# Patient Record
Sex: MALE | Race: WHITE | NOT HISPANIC OR LATINO | Employment: FULL TIME
[De-identification: names, ages, dates, MRNs, and addresses within clinical notes are randomized per-mention and may not be internally consistent; named-entity substitution may affect disease eponyms.]

---

## 2017-02-25 ENCOUNTER — RECORDS - HEALTHEAST (OUTPATIENT)
Dept: ADMINISTRATIVE | Facility: OTHER | Age: 37
End: 2017-02-25

## 2017-07-11 ENCOUNTER — OFFICE VISIT - HEALTHEAST (OUTPATIENT)
Dept: INTERNAL MEDICINE | Facility: CLINIC | Age: 37
End: 2017-07-11

## 2017-07-11 DIAGNOSIS — Z00.00 HEALTH CARE MAINTENANCE: ICD-10-CM

## 2017-07-11 DIAGNOSIS — L65.9 HAIR LOSS: ICD-10-CM

## 2017-07-11 LAB
CHOLEST SERPL-MCNC: 173 MG/DL
FASTING STATUS PATIENT QL REPORTED: YES
HDLC SERPL-MCNC: 71 MG/DL
LDLC SERPL CALC-MCNC: 95 MG/DL
TRIGL SERPL-MCNC: 37 MG/DL

## 2017-07-11 ASSESSMENT — MIFFLIN-ST. JEOR: SCORE: 1846.57

## 2017-07-12 ENCOUNTER — COMMUNICATION - HEALTHEAST (OUTPATIENT)
Dept: INTERNAL MEDICINE | Facility: CLINIC | Age: 37
End: 2017-07-12

## 2017-09-21 ENCOUNTER — COMMUNICATION - HEALTHEAST (OUTPATIENT)
Dept: INTERNAL MEDICINE | Facility: CLINIC | Age: 37
End: 2017-09-21

## 2017-09-21 DIAGNOSIS — L65.9 HAIR LOSS: ICD-10-CM

## 2017-12-18 ENCOUNTER — COMMUNICATION - HEALTHEAST (OUTPATIENT)
Dept: INTERNAL MEDICINE | Facility: CLINIC | Age: 37
End: 2017-12-18

## 2017-12-18 DIAGNOSIS — L65.9 HAIR LOSS: ICD-10-CM

## 2018-01-08 ENCOUNTER — COMMUNICATION - HEALTHEAST (OUTPATIENT)
Dept: INTERNAL MEDICINE | Facility: CLINIC | Age: 38
End: 2018-01-08

## 2018-01-08 DIAGNOSIS — M25.511 RIGHT SHOULDER PAIN: ICD-10-CM

## 2018-05-24 ENCOUNTER — RECORDS - HEALTHEAST (OUTPATIENT)
Dept: ADMINISTRATIVE | Facility: OTHER | Age: 38
End: 2018-05-24

## 2018-06-06 ENCOUNTER — OFFICE VISIT - HEALTHEAST (OUTPATIENT)
Dept: INTERNAL MEDICINE | Facility: CLINIC | Age: 38
End: 2018-06-06

## 2018-06-06 DIAGNOSIS — J32.9 SINUSITIS: ICD-10-CM

## 2018-06-06 ASSESSMENT — MIFFLIN-ST. JEOR: SCORE: 1849.74

## 2018-12-27 ENCOUNTER — OFFICE VISIT - HEALTHEAST (OUTPATIENT)
Dept: INTERNAL MEDICINE | Facility: CLINIC | Age: 38
End: 2018-12-27

## 2018-12-27 DIAGNOSIS — J06.9 VIRAL UPPER RESPIRATORY TRACT INFECTION: ICD-10-CM

## 2018-12-27 ASSESSMENT — MIFFLIN-ST. JEOR: SCORE: 1910.07

## 2019-01-19 ENCOUNTER — RECORDS - HEALTHEAST (OUTPATIENT)
Dept: ADMINISTRATIVE | Facility: OTHER | Age: 39
End: 2019-01-19

## 2019-03-19 ENCOUNTER — COMMUNICATION - HEALTHEAST (OUTPATIENT)
Dept: INTERNAL MEDICINE | Facility: CLINIC | Age: 39
End: 2019-03-19

## 2019-03-19 DIAGNOSIS — L65.9 HAIR LOSS: ICD-10-CM

## 2019-05-03 ENCOUNTER — COMMUNICATION - HEALTHEAST (OUTPATIENT)
Dept: INTERNAL MEDICINE | Facility: CLINIC | Age: 39
End: 2019-05-03

## 2019-06-26 ENCOUNTER — COMMUNICATION - HEALTHEAST (OUTPATIENT)
Dept: INTERNAL MEDICINE | Facility: CLINIC | Age: 39
End: 2019-06-26

## 2019-06-26 ENCOUNTER — RECORDS - HEALTHEAST (OUTPATIENT)
Dept: ADMINISTRATIVE | Facility: OTHER | Age: 39
End: 2019-06-26

## 2019-06-26 DIAGNOSIS — L65.9 HAIR LOSS: ICD-10-CM

## 2019-08-13 ENCOUNTER — RECORDS - HEALTHEAST (OUTPATIENT)
Dept: ADMINISTRATIVE | Facility: OTHER | Age: 39
End: 2019-08-13

## 2019-11-21 ENCOUNTER — OFFICE VISIT - HEALTHEAST (OUTPATIENT)
Dept: INTERNAL MEDICINE | Facility: CLINIC | Age: 39
End: 2019-11-21

## 2019-11-21 DIAGNOSIS — Z13.220 SCREENING FOR HYPERLIPIDEMIA: ICD-10-CM

## 2019-11-21 DIAGNOSIS — Z00.00 ENCOUNTER FOR GENERAL HEALTH EXAMINATION: ICD-10-CM

## 2019-11-21 DIAGNOSIS — M85.80 OSTEOPENIA, UNSPECIFIED LOCATION: ICD-10-CM

## 2019-11-21 LAB
ALBUMIN SERPL-MCNC: 4.5 G/DL (ref 3.5–5)
ALP SERPL-CCNC: 66 U/L (ref 45–120)
ALT SERPL W P-5'-P-CCNC: 23 U/L (ref 0–45)
ANION GAP SERPL CALCULATED.3IONS-SCNC: 9 MMOL/L (ref 5–18)
AST SERPL W P-5'-P-CCNC: 27 U/L (ref 0–40)
BASOPHILS # BLD AUTO: 0.1 THOU/UL (ref 0–0.2)
BASOPHILS NFR BLD AUTO: 1 % (ref 0–2)
BILIRUB SERPL-MCNC: 0.9 MG/DL (ref 0–1)
BUN SERPL-MCNC: 17 MG/DL (ref 8–22)
CALCIUM SERPL-MCNC: 10 MG/DL (ref 8.5–10.5)
CHLORIDE BLD-SCNC: 103 MMOL/L (ref 98–107)
CHOLEST SERPL-MCNC: 198 MG/DL
CO2 SERPL-SCNC: 28 MMOL/L (ref 22–31)
CREAT SERPL-MCNC: 1.23 MG/DL (ref 0.7–1.3)
EOSINOPHIL # BLD AUTO: 0.1 THOU/UL (ref 0–0.4)
EOSINOPHIL NFR BLD AUTO: 2 % (ref 0–6)
ERYTHROCYTE [DISTWIDTH] IN BLOOD BY AUTOMATED COUNT: 10.9 % (ref 11–14.5)
FASTING STATUS PATIENT QL REPORTED: YES
GFR SERPL CREATININE-BSD FRML MDRD: >60 ML/MIN/1.73M2
GLUCOSE BLD-MCNC: 97 MG/DL (ref 70–125)
HCT VFR BLD AUTO: 41.2 % (ref 40–54)
HDLC SERPL-MCNC: 79 MG/DL
HGB BLD-MCNC: 13.9 G/DL (ref 14–18)
LDLC SERPL CALC-MCNC: 95 MG/DL
LYMPHOCYTES # BLD AUTO: 1.8 THOU/UL (ref 0.8–4.4)
LYMPHOCYTES NFR BLD AUTO: 19 % (ref 20–40)
MCH RBC QN AUTO: 32.3 PG (ref 27–34)
MCHC RBC AUTO-ENTMCNC: 33.8 G/DL (ref 32–36)
MCV RBC AUTO: 96 FL (ref 80–100)
MONOCYTES # BLD AUTO: 0.7 THOU/UL (ref 0–0.9)
MONOCYTES NFR BLD AUTO: 7 % (ref 2–10)
NEUTROPHILS # BLD AUTO: 6.5 THOU/UL (ref 2–7.7)
NEUTROPHILS NFR BLD AUTO: 71 % (ref 50–70)
PLATELET # BLD AUTO: 203 THOU/UL (ref 140–440)
PMV BLD AUTO: 7.2 FL (ref 7–10)
POTASSIUM BLD-SCNC: 3.9 MMOL/L (ref 3.5–5)
PROT SERPL-MCNC: 6.9 G/DL (ref 6–8)
RBC # BLD AUTO: 4.31 MILL/UL (ref 4.4–6.2)
SODIUM SERPL-SCNC: 140 MMOL/L (ref 136–145)
TRIGL SERPL-MCNC: 118 MG/DL
WBC: 9.1 THOU/UL (ref 4–11)

## 2019-11-21 ASSESSMENT — MIFFLIN-ST. JEOR: SCORE: 1924.97

## 2019-11-22 LAB — 25(OH)D3 SERPL-MCNC: 41.2 NG/ML (ref 30–80)

## 2019-11-24 ENCOUNTER — COMMUNICATION - HEALTHEAST (OUTPATIENT)
Dept: INTERNAL MEDICINE | Facility: CLINIC | Age: 39
End: 2019-11-24

## 2019-11-25 ENCOUNTER — RECORDS - HEALTHEAST (OUTPATIENT)
Dept: ADMINISTRATIVE | Facility: OTHER | Age: 39
End: 2019-11-25

## 2019-12-15 ENCOUNTER — RECORDS - HEALTHEAST (OUTPATIENT)
Dept: ADMINISTRATIVE | Facility: OTHER | Age: 39
End: 2019-12-15

## 2020-08-17 ENCOUNTER — COMMUNICATION - HEALTHEAST (OUTPATIENT)
Dept: INTERNAL MEDICINE | Facility: CLINIC | Age: 40
End: 2020-08-17

## 2020-08-17 DIAGNOSIS — Z30.2 ENCOUNTER FOR VASECTOMY: ICD-10-CM

## 2020-09-16 ENCOUNTER — RECORDS - HEALTHEAST (OUTPATIENT)
Dept: ADMINISTRATIVE | Facility: OTHER | Age: 40
End: 2020-09-16

## 2020-09-21 ENCOUNTER — COMMUNICATION - HEALTHEAST (OUTPATIENT)
Dept: INTERNAL MEDICINE | Facility: CLINIC | Age: 40
End: 2020-09-21

## 2020-09-21 DIAGNOSIS — L65.9 HAIR LOSS: ICD-10-CM

## 2020-09-22 RX ORDER — FINASTERIDE 1 MG/1
TABLET, FILM COATED ORAL
Qty: 90 TABLET | Refills: 3 | Status: SHIPPED | OUTPATIENT
Start: 2020-09-22 | End: 2021-10-14

## 2020-10-21 ENCOUNTER — RECORDS - HEALTHEAST (OUTPATIENT)
Dept: ADMINISTRATIVE | Facility: OTHER | Age: 40
End: 2020-10-21

## 2021-02-01 ENCOUNTER — RECORDS - HEALTHEAST (OUTPATIENT)
Dept: ADMINISTRATIVE | Facility: OTHER | Age: 41
End: 2021-02-01

## 2021-05-30 NOTE — TELEPHONE ENCOUNTER
RN cannot approve Refill Request    RN can NOT refill this medication med is not covered by policy/route to provider. Last office visit: Visit date not found Last Physical: 7/11/2017 Last MTM visit: Visit date not found Last visit same specialty: 12/27/2018 Chad Ruiz MD.  Next visit within 3 mo: Visit date not found  Next physical within 3 mo: Visit date not found      Joanne Maldonado, [facility] Triage/Med Refill 6/26/2019    Requested Prescriptions   Pending Prescriptions Disp Refills     finasteride (PROPECIA) 1 mg tablet 90 tablet 3     Sig: Take 1 tablet (1 mg total) by mouth daily.       There is no refill protocol information for this order

## 2021-05-30 NOTE — TELEPHONE ENCOUNTER
Refill Request  Did you contact pharmacy: No.  Patient was informed to call the pharmacy.  Medication name: Finasteride 1 mg  Requested Prescriptions      No prescriptions requested or ordered in this encounter     Who prescribed the medication: Dr. Barlow  Pharmacy Name and Location: [facility]   Is patient out of medication: No.  3 days left  Patient notified refills processed in 72 hours:  yes  Okay to leave a detailed message: yes

## 2021-05-31 VITALS — HEIGHT: 75 IN | WEIGHT: 188.7 LBS | BODY MASS INDEX: 23.46 KG/M2

## 2021-06-01 VITALS — BODY MASS INDEX: 23.55 KG/M2 | HEIGHT: 75 IN | WEIGHT: 189.4 LBS

## 2021-06-02 VITALS — WEIGHT: 202.7 LBS | BODY MASS INDEX: 25.2 KG/M2 | HEIGHT: 75 IN

## 2021-06-03 VITALS
BODY MASS INDEX: 25.66 KG/M2 | HEART RATE: 58 BPM | RESPIRATION RATE: 16 BRPM | OXYGEN SATURATION: 97 % | WEIGHT: 206.4 LBS | DIASTOLIC BLOOD PRESSURE: 70 MMHG | SYSTOLIC BLOOD PRESSURE: 100 MMHG | HEIGHT: 75 IN

## 2021-06-03 NOTE — PATIENT INSTRUCTIONS - HE
1. Discussed healthy diet and exercise.     2. Tetanus booster today.     3. Lab testing today.     4. Follow up in one year.

## 2021-06-03 NOTE — PROGRESS NOTES
ASSESSMENT/PLAN:    1. Screening for hyperlipidemia  - Lipid Cascade    2. Encounter for general health examination  His history and exam are unremarkable. See documentation.    - Comprehensive Metabolic Panel  - HM1(CBC and Differential)    3. Screening for vitamin D deficiency  - Vitamin D, Total (25-Hydroxy)    4. Td immunization  Due for booster    5. Male pattern hair loss  We discussed options.  His hair loss is physiologic.  He opts to try propecia, we discussed potential side effects.     Patient Instructions   1. Discussed healthy diet and exercise.     2. Tetanus booster today.     3. Lab testing today.     4. Follow up in one year.      CHIEF COMPLAINT:  Chief Complaint   Patient presents with     Annual Exam     Fasting labs- No concerns     HISTORY OF PRESENT ILLNESS:  Pradip is a 39 y.o. male presenting to the clinic today for general health evaluation.  He feels well over all.  Has been an avid exerciser.  No unusual dyspnea or cough, or changes in bowel or bladder function. Would like to address balding hair loss.     REVIEW OF SYSTEMS:   Constitutional: no fever, chills, or sweats  Respiratory: No wheezes, cough, shortness of breath  Cardiovascular: No chest pain or palpitations  Gastrointestinal: No nausea, vomiting, diarrhea, dyspepsia, or pain  Neurological: No headache, arm or leg numbness or weakness, or gait disturbance  All other systems on reveiw are negative.    PFSH:  Social History     Tobacco Use   Smoking Status Never Smoker   Smokeless Tobacco Current User     Types: Chew     Family History   Problem Relation Age of Onset     Obesity Mother      Hyperlipidemia Father      Social History     Socioeconomic History     Marital status:      Spouse name: Not on file     Number of children: Not on file     Years of education: Not on file     Highest education level: Not on file   Occupational History     Not on file   Social Needs     Financial resource strain: Not on file     Food  "insecurity:     Worry: Not on file     Inability: Not on file     Transportation needs:     Medical: Not on file     Non-medical: Not on file   Tobacco Use     Smoking status: Never Smoker     Smokeless tobacco: Current User     Types: Chew   Substance and Sexual Activity     Alcohol use: Yes     Comment: WEEKLY      Drug use: No     Sexual activity: Yes     Partners: Female   Lifestyle     Physical activity:     Days per week: Not on file     Minutes per session: Not on file     Stress: Not on file   Relationships     Social connections:     Talks on phone: Not on file     Gets together: Not on file     Attends Church service: Not on file     Active member of club or organization: Not on file     Attends meetings of clubs or organizations: Not on file     Relationship status: Not on file     Intimate partner violence:     Fear of current or ex partner: Not on file     Emotionally abused: Not on file     Physically abused: Not on file     Forced sexual activity: Not on file   Other Topics Concern     Not on file   Social History Narrative    He is  with 2 children.  He is a banker.     Past Surgical History:   Procedure Laterality Date     periodontal  2001     No Known Allergies  Past Medical History:   Diagnosis Date     Migraine      VITALS:  Vitals:    11/21/19 0844   BP: 100/70   Patient Site: Left Arm   Patient Position: Sitting   Cuff Size: Adult Large   Pulse: (!) 58   Resp: 16   SpO2: 97%   Weight: 206 lb 6.4 oz (93.6 kg)   Height: 6' 2.88\" (1.902 m)     Wt Readings from Last 3 Encounters:   11/21/19 206 lb 6.4 oz (93.6 kg)   12/27/18 202 lb 11.2 oz (91.9 kg)   06/06/18 189 lb 6.4 oz (85.9 kg)     Body mass index is 25.88 kg/m .  PHYSICAL EXAM:  General Appearance: In no acute distress    /70 (Patient Site: Left Arm, Patient Position: Sitting, Cuff Size: Adult Large)   Pulse (!) 58   Resp 16   Ht 6' 2.88\" (1.902 m)   Wt 206 lb 6.4 oz (93.6 kg)   SpO2 97%   BMI 25.88 kg/m       EYES: " Clear, fundi are unremarkable, discs flat   HEENT: Without congestion  NECK:  without cervical or axillary adenopathy, thyroid normal  RESPIRATORY: Clear   CARDIOVASCULAR: S1, S2  ABDOMEN: soft, flat, and non-tender, without mass, rebound, or guarding  GENITOURINARY: normal testes and phallus  EXTREMITIES: No joint swelling, no ulcer or edema  NEUROLOGIC: Non-focal, no arm or leg  weakness, speech is clear, gait is normal  PSYCHIATRIC: Oriented X 3, without confusion, behavior and affect normal, thinking is clear    Current Outpatient Medications   Medication Sig Dispense Refill     finasteride (PROPECIA) 1 mg tablet Take 1 tablet (1 mg total) by mouth daily. 90 tablet 3

## 2021-06-10 NOTE — TELEPHONE ENCOUNTER
Spoke with speciality scheduling and they assisted in scheduling pt an appt with [facility].  Speciality Scheduling contacted pt with info.

## 2021-06-10 NOTE — TELEPHONE ENCOUNTER
Check with Pradip regarding the reason for the urology referral request please.  We then can authorize it.    Tim Barlow MD

## 2021-06-11 NOTE — TELEPHONE ENCOUNTER
RN cannot approve Refill Request    RN can NOT refill this medication med is not covered by policy/route to provider. Last office visit: Visit date not found Last Physical: 7/11/2017 Last MTM visit: Visit date not found Last visit same specialty: 12/27/2018 Chad Ruiz MD.  Next visit within 3 mo: Visit date not found  Next physical within 3 mo: Visit date not found      Bryanna Jackson, [facility] Triage/Med Refill 9/22/2020    Requested Prescriptions   Pending Prescriptions Disp Refills     finasteride (PROPECIA) 1 mg tablet [Pharmacy Med Name: FINASTERIDE 1 MG TABLET] 90 tablet 3     Sig: TAKE 1 TABLET BY MOUTH EVERY DAY       There is no refill protocol information for this order

## 2021-06-18 NOTE — PROGRESS NOTES
" ASSESSMENT AND PLAN:    1. Sinusitis  Likely left maxillary sinusitis, following URI.  Will treat with azithromycin 250 mg two today, then one dailly for 4 days.  Follow up if fails to improve.     2. Cervical adenopathy  This is reactive, and doubt significant.  He is urged to follow and be seen for re-evaluation if this enlarges or fails to resolve.  He notes that it is already improved on the L.     CHIEF COMPLAINT:  Chief Complaint   Patient presents with     Sinusitis     congestion; sinus pressure x 2 weeks     HISTORY OF PRESENT ILLNESS:  Pradip Underwood is a 37 y.o. male with URI about 2 weeks ago.  Has persistent congestion and facial discomfort since, with nasal congestion.  No fever.  Has noted left neck lymph node as well.  Early on, there was strep throat, strep test at [facility] clinic was negative.  No cough, or fever, or chills.      Active Ambulatory Problems     Diagnosis Date Noted     Migraine with aura 10/04/2016     Resolved Ambulatory Problems     Diagnosis Date Noted     No Resolved Ambulatory Problems     No Additional Past Medical History     Past Surgical History:   Procedure Laterality Date     periodontal  2001     VITALS:  Vitals:    06/06/18 1030   BP: 118/78   Patient Site: Left Arm   Patient Position: Sitting   Cuff Size: Adult Regular   Pulse: 60   Temp: 97.9  F (36.6  C)   SpO2: 99%   Weight: 189 lb 6.4 oz (85.9 kg)   Height: 6' 3\" (1.905 m)     Wt Readings from Last 3 Encounters:   06/06/18 189 lb 6.4 oz (85.9 kg)   07/11/17 188 lb 11.2 oz (85.6 kg)   10/04/16 187 lb 4.8 oz (85 kg)     PHYSICAL EXAM:  Constitutional:  Well appearing in NAD, alert and oriented  Ears:  Clear.  Oropharynx:  mild erythema  Nose: bilateral rhinitis, L > R, left mucopurulence  Neck:  Supple, no significant adenopathy, there is anterior cervical adenopathy, L > R shotty, not hard, moveable, slightly tender on L   Cardiac:  S1 S2 without murmur, rhythm regular  Lungs: Clear to auscultation, without wheezes " or rales    Current Outpatient Prescriptions   Medication Sig Dispense Refill     finasteride (PROPECIA) 1 mg tablet TAKE 1 TABLET BY MOUTH DAILY. 90 tablet 3     azithromycin (ZITHROMAX) 250 MG tablet Take 500 mg (2 x 250 mg tablets) on day 1 followed by 250 mg (1 tablet) on days 2-5. 6 tablet 0     No current facility-administered medications for this visit.      Chad Ruiz MD  Internal Medicine  [facility]

## 2021-06-19 NOTE — LETTER
Letter by Chad Ruiz MD at      Author: Chad Ruiz MD Service: -- Author Type: --    Filed:  Encounter Date: 11/24/2019 Status: Signed         Pradip Underwood  [address] [place] [address]     November 24, 2019     Dear Mr. Underwood,    Below are the results from your recent visit:    Resulted Orders   Lipid Cascade   Result Value Ref Range    Cholesterol 198 <=199 mg/dL    Triglycerides 118 <=149 mg/dL    HDL Cholesterol 79 >=40 mg/dL    LDL Calculated 95 <=129 mg/dL    Patient Fasting > 8hrs? Yes    Comprehensive Metabolic Panel   Result Value Ref Range    Sodium 140 136 - 145 mmol/L    Potassium 3.9 3.5 - 5.0 mmol/L    Chloride 103 98 - 107 mmol/L    CO2 28 22 - 31 mmol/L    Anion Gap, Calculation 9 5 - 18 mmol/L    Glucose 97 70 - 125 mg/dL    BUN 17 8 - 22 mg/dL    Creatinine 1.23 0.70 - 1.30 mg/dL    GFR MDRD Af Amer >60 >60 mL/min/1.73m2    GFR MDRD Non Af Amer >60 >60 mL/min/1.73m2    Bilirubin, Total 0.9 0.0 - 1.0 mg/dL    Calcium 10.0 8.5 - 10.5 mg/dL    Protein, Total 6.9 6.0 - 8.0 g/dL    Albumin 4.5 3.5 - 5.0 g/dL    Alkaline Phosphatase 66 45 - 120 U/L    AST 27 0 - 40 U/L    ALT 23 0 - 45 U/L    Narrative    Fasting Glucose reference range is 70-99 mg/dL per  American Diabetes Association (ADA) guidelines.   Vitamin D, Total (25-Hydroxy)   Result Value Ref Range    Vitamin D, Total (25-Hydroxy) 41.2 30.0 - 80.0 ng/mL    Narrative    Deficiency <10.0 ng/mL  Insufficiency 10.0-29.9 ng/mL  Sufficiency 30.0-80.0 ng/mL  Toxicity (possible) >100.0 ng/mL   HM1 (CBC with Diff)   Result Value Ref Range    WBC 9.1 4.0 - 11.0 thou/uL    RBC 4.31 (L) 4.40 - 6.20 mill/uL    Hemoglobin 13.9 (L) 14.0 - 18.0 g/dL    Hematocrit 41.2 40.0 - 54.0 %    MCV 96 80 - 100 fL    MCH 32.3 27.0 - 34.0 pg    MCHC 33.8 32.0 - 36.0 g/dL    RDW 10.9 (L) 11.0 - 14.5 %    Platelets 203 140 - 440 thou/uL    MPV 7.2 7.0 - 10.0 fL    Neutrophils % 71 (H) 50 - 70 %    Lymphocytes % 19 (L) 20 - 40 %    Monocytes % 7 2  - 10 %    Eosinophils % 2 0 - 6 %    Basophils % 1 0 - 2 %    Neutrophils Absolute 6.5 2.0 - 7.7 thou/uL    Lymphocytes Absolute 1.8 0.8 - 4.4 thou/uL    Monocytes Absolute 0.7 0.0 - 0.9 thou/uL    Eosinophils Absolute 0.1 0.0 - 0.4 thou/uL    Basophils Absolute 0.1 0.0 - 0.2 thou/uL     Your tests are all good. The minor abnormalities are not significant.     Please call with questions or contact us using [facility].    Sincerely,        Electronically signed by Chad Ruiz MD

## 2021-06-22 NOTE — PROGRESS NOTES
" ASSESSMENT AND PLAN:    1. Viral upper respiratory tract infection  History and symptoms consistent with acute viral syndrome with upper respiratory symptoms.  Can't exclude attenuated influenza.       CHIEF COMPLAINT:  Chief Complaint   Patient presents with     Cough     since 12/21/18, productive cough with some yellow/ light green mucus     Fatigue       HISTORY OF PRESENT ILLNESS:  Pradip Underwood is a 38 y.o. male with three days of nasal congestion, fatigue, arthralgia and myalgia and some sore throat and now cough. All has improved over three days with rest, and fluids.  Now with mostly dry cough,  No fever, or chills.  Never had diarrhea or nausea or abdominal pain.  No pleuritic pain.  No mucopurulent nasal drainage.     REVIEW OF SYSTEMS:   See HPI, all other systems on review are negative.    Active Ambulatory Problems     Diagnosis Date Noted     Migraine with aura 10/04/2016     Resolved Ambulatory Problems     Diagnosis Date Noted     No Resolved Ambulatory Problems     No Additional Past Medical History     Past Surgical History:   Procedure Laterality Date     periodontal  2001       VITALS:  Vitals:    12/27/18 1524   BP: 124/66   Patient Site: Left Arm   Patient Position: Sitting   Cuff Size: Adult Regular   Pulse: 68   Temp: 98.3  F (36.8  C)   TempSrc: Oral   SpO2: 97%   Weight: 202 lb 11.2 oz (91.9 kg)   Height: 6' 3\" (1.905 m)     Wt Readings from Last 3 Encounters:   12/27/18 202 lb 11.2 oz (91.9 kg)   06/06/18 189 lb 6.4 oz (85.9 kg)   07/11/17 188 lb 11.2 oz (85.6 kg)     PHYSICAL EXAM:  Constitutional:  Well appearing in NAD, alert and oriented  Ears:  Clear.  Oropharynx: mild posterior erythema  Nose: clear  Neck:  Supple, no significant adenopathy.  Cardiac:  S1 S2 without murmur, rhythm regular  Lungs: Clear to auscultation, without wheezes or rales  Abdomen:   Soft, flat and non-tender, without  guarding, rebound, or mass.      Current Outpatient Medications   Medication Sig Dispense " Refill     finasteride (PROPECIA) 1 mg tablet TAKE 1 TABLET BY MOUTH DAILY. 90 tablet 3     azithromycin (ZITHROMAX Z-TESS) 250 MG tablet Take 2 tablets (500 mg) on  Day 1,  followed by 1 tablet (250 mg) once daily on Days 2 through 5. 6 tablet 0     No current facility-administered medications for this visit.      Chad Ruiz MD  Internal Medicine  [facility]

## 2021-06-25 NOTE — TELEPHONE ENCOUNTER
RN cannot approve Refill Request    RN can NOT refill this medication med is not covered by policy/route to provider     . Last office visit: Visit date not found Last Physical: 7/11/2017 Last MTM visit: Visit date not found Last visit same specialty: 12/27/2018 Chad Ruiz MD.  Next visit within 3 mo: Visit date not found  Next physical within 3 mo: Visit date not found      Bryanna Jackson, [facility] Triage/Med Refill 3/21/2019    Requested Prescriptions   Pending Prescriptions Disp Refills     finasteride (PROPECIA) 1 mg tablet [Pharmacy Med Name: FINASTERIDE 1 MG TABLET] 90 tablet 3     Sig: TAKE 1 TABLET BY MOUTH DAILY.    There is no refill protocol information for this order

## 2021-06-27 ENCOUNTER — HEALTH MAINTENANCE LETTER (OUTPATIENT)
Age: 41
End: 2021-06-27

## 2021-08-18 ENCOUNTER — OFFICE VISIT (OUTPATIENT)
Dept: URGENT CARE | Facility: URGENT CARE | Age: 41
End: 2021-08-18
Payer: COMMERCIAL

## 2021-08-18 ENCOUNTER — NURSE TRIAGE (OUTPATIENT)
Dept: NURSING | Facility: CLINIC | Age: 41
End: 2021-08-18

## 2021-08-18 VITALS
DIASTOLIC BLOOD PRESSURE: 62 MMHG | OXYGEN SATURATION: 97 % | WEIGHT: 200 LBS | BODY MASS INDEX: 25.08 KG/M2 | TEMPERATURE: 98.7 F | HEART RATE: 57 BPM | SYSTOLIC BLOOD PRESSURE: 112 MMHG

## 2021-08-18 DIAGNOSIS — T63.441A BEE STING REACTION, ACCIDENTAL OR UNINTENTIONAL, INITIAL ENCOUNTER: ICD-10-CM

## 2021-08-18 DIAGNOSIS — L03.115 CELLULITIS OF RIGHT LOWER EXTREMITY: Primary | ICD-10-CM

## 2021-08-18 PROCEDURE — 99213 OFFICE O/P EST LOW 20 MIN: CPT | Performed by: NURSE PRACTITIONER

## 2021-08-18 RX ORDER — CEPHALEXIN 500 MG/1
500 CAPSULE ORAL 4 TIMES DAILY
Qty: 28 CAPSULE | Refills: 0 | Status: SHIPPED | OUTPATIENT
Start: 2021-08-18 | End: 2021-08-25

## 2021-08-18 ASSESSMENT — ENCOUNTER SYMPTOMS
PARESTHESIAS: 0
PALPITATIONS: 0
ARTHRALGIAS: 0
WEAKNESS: 0
ACTIVITY CHANGE: 0
WOUND: 1
WHEEZING: 0
DIZZINESS: 0
CHILLS: 0
NAUSEA: 0
FATIGUE: 0
SLEEP DISTURBANCE: 0
ADENOPATHY: 0
LIGHT-HEADEDNESS: 0
SHORTNESS OF BREATH: 0
ABDOMINAL PAIN: 0
FEVER: 0
VOMITING: 0
APPETITE CHANGE: 0
MYALGIAS: 0
CHEST TIGHTNESS: 0
DIAPHORESIS: 0
JOINT SWELLING: 0
COLOR CHANGE: 1

## 2021-08-18 NOTE — TELEPHONE ENCOUNTER
[facility] triage call :   Presenting problem : Pt called . On 8/10/21 had 4 bee stings on R leg and   painless , swelling in R ankle since 8/17/21 .  Currently : no fever   Guideline used :  Bee sting A Oh.   Disposition and recommendations : see today in office / or [facility]  urgent care or  [facility] in clinic  and sent to  for appt availability .   Caller verbalizes understanding and denies further questions and will call back if further symptoms to triage or questions  . Cathleen Champion RN  - [place] Nurse Advisor   COVID 19 Nurse Triage Plan/Patient Instructions    Please be aware that novel coronavirus (COVID-19) may be circulating in the community. If you develop symptoms such as fever, cough, or SOB or if you have concerns about the presence of another infection including coronavirus (COVID-19), please contact your health care provider or visit [email or web address].     Disposition/Instructions    In-Person Visit with provider recommended. Reference Visit Selection Guide.      Additional Information    Negative: Passed out (i.e., fainted, collapsed and was not responding)    Negative: Wheezing or difficulty breathing    Negative: Hoarseness, cough, or tightness in the throat or chest    Negative: Swollen tongue or difficulty swallowing    Negative: Life-threatening reaction in past to sting (anaphylaxis) and < 2 hours since sting    Negative: Sounds like a life-threatening emergency to the triager    Negative: Not a bee, wasp, hornet, or yellow jacket sting    Negative: Widespread hives, itching, or facial swelling and started within 2 hours of sting    Negative: Vomiting or abdominal cramps and started within 2 hours of sting    Negative: Gave epinephrine shot and no symptoms now    Negative: Patient sounds very sick or weak to the triager    Negative: Sting inside the mouth    Negative: Sting on eyeball (e.g., cornea)    Negative: More than 50 stings    Negative: Fever and area is  red    Negative: Fever and area is very tender to touch    Negative: Red streak or red line and length > 2 inches (5 cm)    Negative: Red or very tender (to touch) area, and started over 24 hours after the sting    Negative: Red or very tender (to touch) area, getting larger over 48 hours after the sting    Swelling is huge (e.g., > 4 inches or 10 cm, spreads beyond wrist or ankle)    Protocols used: BEE STING-A-OH

## 2021-08-18 NOTE — PATIENT INSTRUCTIONS
Likely cellulitis from bee sting given worsening after 1 week, keflex  Cannot locate stingers to remove in clinic, body should expel naturally  Epsom salt soaks  This will likely happen again the next time you a stung by the same insect.  Symptoms are usually worst about 48 hours after the sting- you should start to notice improvement 3-5 meyer after the sting however it may take 10 days for symptoms to go away.  Apply ice or a cold washcloth to area to soothe symptoms.  If the sting is on your arm or leg, elevate it to reduce swelling.  Take an antihistamine such as Claritin (loratadine) , Zyrtec (cetirizine) or Allegra (fexofenadine) daily to reduce itching.  Take an NSAID such as ibuprofen or naproxen as needed for pain  Wash area with soap and water and avoid scratching to prevent infection.  Watch for signs of infection- fever, thick drainage or dramatically worsening redness, swelling or pain 3 -5 days after sting. This is when the large local reaction should be improving  Follow up with primary care provider if you notice no improvement or if the reaction to the bee sting worsens or persists despite treatment provided.  Reevaluation if worsening redness, fever, nausea, vomiting in 1-2days

## 2021-08-18 NOTE — PROGRESS NOTES
Chief Complaint   Patient presents with     Urgent Care     Insect Bites     c/o bee sting on ankle     SUBJECTIVE:  Pradip Underwood is a 41 year old male who presents to the clinic today with right lower extremity faint erythema, warmth, edema, itching for 2 days. He had 4 bee stings occur behind his knee and ankle 8 days ago. He is not sure that he got all the stingers out. Denies fever, red streaking, nausea, vomiting.    No past medical history on file.  finasteride (PROPECIA) 1 mg tablet, [FINASTERIDE (PROPECIA) 1 MG TABLET] TAKE 1 TABLET BY MOUTH EVERY DAY    No current facility-administered medications on file prior to visit.    Social History     Tobacco Use     Smoking status: Never Smoker     Smokeless tobacco: Current User     Types: Chew, Chew   Substance Use Topics     Alcohol use: Yes     Comment: Alcoholic Drinks/day: WEEKLY      No Known Allergies    Review of Systems   Constitutional: Negative for activity change, appetite change, chills, diaphoresis, fatigue and fever.   Respiratory: Negative for chest tightness, shortness of breath and wheezing.    Cardiovascular: Negative for palpitations.   Gastrointestinal: Negative for abdominal pain, nausea and vomiting.   Musculoskeletal: Negative for arthralgias, joint swelling and myalgias.   Skin: Positive for color change and wound. Negative for pallor and rash.   Neurological: Negative for dizziness, weakness, light-headedness and paresthesias.   Hematological: Negative for adenopathy.   Psychiatric/Behavioral: Negative for sleep disturbance.     EXAM:   /62   Pulse 57   Temp 98.7  F (37.1  C) (Oral)   Wt 90.7 kg (200 lb)   SpO2 97%   BMI 25.08 kg/m      Physical Exam  Vitals reviewed.   Constitutional:       Appearance: Normal appearance.   HENT:      Head: Normocephalic and atraumatic.      Nose: Nose normal.      Mouth/Throat:      Mouth: Mucous membranes are moist.      Pharynx: Oropharynx is clear.   Eyes:      Extraocular Movements:  Extraocular movements intact.      Conjunctiva/sclera: Conjunctivae normal.      Pupils: Pupils are equal, round, and reactive to light.   Cardiovascular:      Rate and Rhythm: Normal rate.   Pulmonary:      Effort: Pulmonary effort is normal.   Musculoskeletal:         General: Swelling and signs of injury present. No tenderness. Normal range of motion.      Cervical back: Normal range of motion and neck supple.   Skin:     General: Skin is warm and dry.      Findings: Erythema (faint erythema, edema, warmth from ankle to mid shin, no obvious bee stingers noted, subtle scabs excorations throughout), lesion and rash present.   Neurological:      General: No focal deficit present.      Mental Status: He is alert and oriented to person, place, and time.   Psychiatric:         Mood and Affect: Mood normal.         Behavior: Behavior normal.       ASSESSMENT:    ICD-10-CM    1. Cellulitis of right lower extremity  L03.115 cephALEXin (KEFLEX) 500 MG capsule   2. Bee sting reaction, accidental or unintentional, initial encounter  T63.441A      PLAN:  Patient Instructions   Likely cellulitis from bee sting given worsening after 1 week, keflex  Cannot locate stingers to remove in clinic, body should expel naturally  Epsom salt soaks  This will likely happen again the next time you a stung by the same insect.  Symptoms are usually worst about 48 hours after the sting- you should start to notice improvement 3-5 meyer after the sting however it may take 10 days for symptoms to go away.  Apply ice or a cold washcloth to area to soothe symptoms.  If the sting is on your arm or leg, elevate it to reduce swelling.  Take an antihistamine such as Claritin (loratadine) , Zyrtec (cetirizine) or Allegra (fexofenadine) daily to reduce itching.  Take an NSAID such as ibuprofen or naproxen as needed for pain  Wash area with soap and water and avoid scratching to prevent infection.  Watch for signs of infection- fever, thick drainage or  dramatically worsening redness, swelling or pain 3 -5 days after sting. This is when the large local reaction should be improving  Follow up with primary care provider if you notice no improvement or if the reaction to the bee sting worsens or persists despite treatment provided.  Reevaluation if worsening redness, fever, nausea, vomiting in 1-2days    Follow up with primary care provider with any problems, questions or concerns or if symptoms worsen or fail to improve. Patient agreed to plan and verbalized understanding.    KHARI Penn-BC  [facility]

## 2021-10-06 ENCOUNTER — TELEPHONE (OUTPATIENT)
Dept: INTERNAL MEDICINE | Facility: CLINIC | Age: 41
End: 2021-10-06

## 2021-10-06 NOTE — TELEPHONE ENCOUNTER
Reason for Call:  Other call back    Detailed comments: pt calling and asking for a referral to [facility]  For tingling nerve pain fingers to elbow- pretty mild, but chronic     Phone Number Patient can be reached at: Cell number on file:    Telephone Information:   Mobile [phone]       Best Time:     Can we leave a detailed message on this number? YES    Call taken on 10/6/2021 at 1:33 PM by Bailey Rosas

## 2021-10-08 NOTE — TELEPHONE ENCOUNTER
10/08 lm to call and schedule an appt for at least a telephone visit for referral or appt over 1 year

## 2021-10-14 ENCOUNTER — MYC MEDICAL ADVICE (OUTPATIENT)
Dept: INTERNAL MEDICINE | Facility: CLINIC | Age: 41
End: 2021-10-14

## 2021-10-14 DIAGNOSIS — L65.9 HAIR LOSS: ICD-10-CM

## 2021-10-14 RX ORDER — FINASTERIDE 1 MG/1
TABLET, FILM COATED ORAL
Qty: 90 TABLET | Refills: 3 | Status: SHIPPED | OUTPATIENT
Start: 2021-10-14 | End: 2022-10-04

## 2021-10-16 ENCOUNTER — HEALTH MAINTENANCE LETTER (OUTPATIENT)
Age: 41
End: 2021-10-16

## 2022-03-31 ENCOUNTER — THERAPY VISIT (OUTPATIENT)
Dept: PHYSICAL THERAPY | Facility: CLINIC | Age: 42
End: 2022-03-31
Payer: COMMERCIAL

## 2022-03-31 DIAGNOSIS — M25.511 CHRONIC RIGHT SHOULDER PAIN: Primary | ICD-10-CM

## 2022-03-31 DIAGNOSIS — G89.29 CHRONIC RIGHT SHOULDER PAIN: Primary | ICD-10-CM

## 2022-03-31 PROCEDURE — 97110 THERAPEUTIC EXERCISES: CPT | Mod: GP | Performed by: PHYSICAL THERAPIST

## 2022-03-31 PROCEDURE — 97161 PT EVAL LOW COMPLEX 20 MIN: CPT | Mod: GP | Performed by: PHYSICAL THERAPIST

## 2022-03-31 NOTE — PROGRESS NOTES
Physical Therapy Initial Evaluation  Subjective:  The history is provided by the patient. No  was used.   Patient Health History  Pradip Underwood being seen for R shoulder pain deep inside of armpit. Past issues with this shoulder which he believes was from biceps tendinitis. Feel the most pain with reaching off to side and overhead.     Problem began: 3/3/2022.   Problem occurred: unknown/overuse   Pain is reported as 3/10 on pain scale.    Pertinent medical history includes: none.   Red flags:  None as reported by patient.  Medical allergies: none.   Surgeries include:  Orthopedic surgery. Other surgery history details: Achilles tendon rupture.     Other medications details: finasteride.    Current occupation is .   Primary job tasks include:  Computer work.                  Therapist Generated HPI Evaluation  Problem details: Recurrent R shoulder pain primarily on anterior side and in arm pit. Feels the most pain when swinging a baseball bat or throwing a baseball with his kids. He had a similar episode of pain two years ago that was helped with various stretches to right shoulder..         Type of problem:  Right shoulder.    This is a recurrent condition.  Condition occurred with:  Unknown cause and repetition/overuse.  Where condition occurred: at home and for unknown reasons.  Patient reports pain:  Anterior, medial and in the joint.  Pain is described as aching   Pain radiates to:  Upper arm. Pain is worse in the A.M..  Since onset symptoms are unchanged.  Associated symptoms:  Loss of motion/stiffness and painful arc. Symptoms are exacerbated by using arm at shoulder level, using arm overhead and lifting (rotating arm and opening doorknobs)  Relieved by: stretching.    Previous treatment includes physical therapy (two years ago saw PT for same issue).   Restrictions due to condition include:  Working in normal job without restrictions.  Barriers include:  None as reported by  patient.                        Objective:  System              Cervical/Thoracic Evaluation  Cervical AROM: normal                                  Shoulder Evaluation:  ROM:  AROM:    Flexion:  Left:  WNL    Right:  WNL    Abduction:  Left: WNL   Right:  120    Internal Rotation:  Left:  70    Right:  60  External Rotation:  Left:  80    Right:  80                PROM:        Abduction:  Right:  165                      Pain: Pain with resisted bicep flexion and IR on R side; stiffness above 90 degrees with PROM in ABD on R shoulder    Strength:    Flexion: Left:5/5   Pain:    Right: 5/5     Pain:   Extension:  Left: 5/5    Pain:    Right: 5/5    Pain:  Abduction:  Left: 5/5  Pain:    Right: 3+/5     Pain:    Internal Rotation:  Left:5/5     Pain:    Right: 4/5     Pain:  External Rotation:   Left:5/5     Pain:   Right:4/5     Pain:        Elbow Flexion:  Left:5/5     Pain:    Right:4/5   Strong/painful    Pain:  Elbow Extension:  Left:5/5     Pain:    Right:5/5     Pain:  Stability Testing:  normal      Special Tests:      Left shoulder negative for the following special tests:  Impingement and Rotator cuff tear  Right shoulder positive for the following special tests:Impingement  Right shoulder negative for the following special tests:Rotator cuff tear  Palpation:  Palpation assessed shoulder: pec minor/jaime R shoulder; anterior deltoid tenderness.    Right shoulder tenderness present at: Biceps; Deltoid and Bicipital Groove  Right shoulder tenderness not present at:Supraspinatus; Infraspinatus; Teres Minor or Subscapularis  Mobility Tests:  normal (GHJ joint sits anteriorly BUE)                                                 General     ROS    Assessment/Plan:    Patient is a 41 year old male with right side shoulder complaints.    Patient has the following significant findings with corresponding treatment plan.                Diagnosis 1:  R shoulder pain, mechanical; consistent with biceps tendinitis and  impingement  Pain -  manual therapy, self management, education and home program  Decreased ROM/flexibility - manual therapy, therapeutic exercise and home program  Decreased joint mobility - manual therapy, therapeutic exercise and home program  Decreased strength - therapeutic exercise, therapeutic activities and home program  Impaired posture - neuro re-education and home program    Therapy Evaluation Codes:   Cumulative Therapy Evaluation is: Low complexity.    Previous and current functional limitations:  (See Goal Flow Sheet for this information)    Short term and Long term goals: (See Goal Flow Sheet for this information)     Communication ability:  Patient appears to be able to clearly communicate and understand verbal and written communication and follow directions correctly.  Treatment Explanation - The following has been discussed with the patient:   RX ordered/plan of care  Anticipated outcomes  Possible risks and side effects  This patient would benefit from PT intervention to resume normal activities.   Rehab potential is excellent.    Frequency:  2 X a month, once daily  Duration:  for 2 months  Discharge Plan:  Achieve all LTG.  Independent in home treatment program.  Reach maximal therapeutic benefit.    Please refer to the daily flowsheet for treatment today, total treatment time and time spent performing 1:1 timed codes.

## 2022-07-23 ENCOUNTER — HEALTH MAINTENANCE LETTER (OUTPATIENT)
Age: 42
End: 2022-07-23

## 2022-09-16 ENCOUNTER — NURSE TRIAGE (OUTPATIENT)
Dept: NURSING | Facility: CLINIC | Age: 42
End: 2022-09-16

## 2022-09-16 NOTE — TELEPHONE ENCOUNTER
"Tues started having \"cold\" SXs took a COVID test that was neg, continued having nasal congestion, slight ST, and feeling a little run down so took another test last night that was positive.   Pt is afebrile temp 97.2, O2 Sats 97%  Pt calling with questions about home isolation. HC advise given and all pt's questions were addressed. Pt will call back or seek immediate care as appropriate if SXs persist, worsen, or new SXs develop.  ROGERS STOUT RN on 9/16/2022 at 8:55 AM    Coronavirus (COVID-19) Notification    Caller Name (Patient, parent, daughter/son, grandparent, etc)  Patient calling    Reason for call  Positive home test 9/15, SXs starting 9/13    Gather patient reported symptoms   Assessment   Current Symptoms at time of phone call, reported by patient: (if no symptoms, document No symptoms] Tues started having \"cold\" SXs took a COVID test that was neg, continued having nasal congestion, slight ST, and feeling a little run down so took another test last night that was positive.   Pt is afebrile temp 97.2, O2 Sats 97%     Date of Symptom(s) onset (if applicable) 9/13     If at time of call, Patients symptoms hare worsened, the Patient should contact 911 or have someone drive them to Emergency Dept promptly:      If Patient calling 911, inform 911 personal that you have tested positive for the Coronavirus (COVID-19).  Place mask on and await 911 to arrive.    If Emergency Dept, If possible, please have another adult drive you to the Emergency Dept but you need to wear mask when in contact with other people.      Monoclonal Antibody Administration    You may be eligible to receive a new treatment with a monoclonal antibody for preventing hospitalization in patients at high risk for complications from COVID-19. This medication is still experimental and available on a limited basis; it is given through an IV and must be given at an infusion center. Please note that not all people who are eligible will receive " the medication since it is in limited supply.  Is the patient symptomatic and onset of symptoms within the last 7 days?  Yes  Is the patient interested in a visit with a provider to discuss treatment options?: No.  Reason patient declined:  Not that sick and don't think I will get worse (save for people who possibly need it more)    Review information with Patient    Your result was positive. This means you have COVID-19 (coronavirus).      How can I protect others?    These guidelines are for isolating before returning to work, school or .       If you DO have symptoms:  o Stay home and away from others  - For at least 5 days after your symptoms started, AND   - You are fever free for 24 hours (with no medicine that reduces fever), AND  - Your other symptoms are better.  o Wear a mask for 10 full days any time you are around others.    If you DON'T have symptoms:  o Stay at home and away from others for at least 5 days after your positive test.  o Wear a mask for 10 full days any time you are around others.    There may be different guidelines for healthcare facilities. Please check with the specific sites before arriving.     If you've been told by a doctor that you were severely ill with COVID-19 or are immunocompromised, you should isolate for at least 10 days.    You should not go back to work until you meet the guidelines above for ending your home isolation. You don't need to be retested for COVID-19 before going back to work--studies show that you won't spread the virus if it's been at least 10 days since your symptoms started (or 20 days, if you have a weak immune system).    Employers, schools, and daycares: This is an official notice for this person's medical guidelines for returning in-person. They must meet the above guidelines before going back to work, school, or  in person.    You will receive a positive COVID-19 letter via [employer] or the mail soon with additional self-care information.       Would you like me to review some of that information with you now?  Yes    How can I take care of myself?      Get lots of rest. Drink extra fluids (unless a doctor has told you not to).      Take Tylenol (acetaminophen) for fever or pain. If you have liver or kidney problems, ask your family doctor if it's okay to take Tylenol.     Take either:     650 mg (two 325 mg pills) every 4 to 6 hours, or     1,000 mg (two 500 mg pills) every 8 hours as needed.     Note: Do not take more than 3,000 mg in one day. Acetaminophen is found in many medicines (both prescribed and over-the-counter medicines). Read all labels to be sure you don't take too much.    For children, check the Tylenol bottle for the right dose (based on their age or weight).      If you have other health problems (like cancer, heart failure, an organ transplant or severe kidney disease): Call your specialty clinic if you don't feel better in the next 2 days.      Know when to call 911: Emergency warning signs include:    Trouble breathing or shortness of breath    Pain or pressure in the chest that doesn't go away    Feeling confused like you haven't felt before, or not being able to wake up    Bluish-colored lips or face        If you were tested for an upcoming procedure, please contact your provider for next steps.     ROGERS STOUT RN      Reason for Disposition    [1] COVID-19 diagnosed by positive lab test (e.g., PCR, rapid self-test kit) AND [2] mild symptoms (e.g., cough, fever, others) AND [3] no complications or SOB    Additional Information    Negative: SEVERE difficulty breathing (e.g., struggling for each breath, speaks in single words)    Negative: Difficult to awaken or acting confused (e.g., disoriented, slurred speech)    Negative: Bluish (or gray) lips or face now    Negative: Shock suspected (e.g., cold/pale/clammy skin, too weak to stand, low BP, rapid pulse)    Negative: Sounds like a life-threatening emergency to the  triager    Negative: [1] Diagnosed or suspected COVID-19 AND [2] symptoms lasting 3 or more weeks    Negative: [1] COVID-19 exposure AND [2] no symptoms    Negative: COVID-19 vaccine reaction suspected (e.g., fever, headache, muscle aches) occurring 1 to 3 days after getting vaccine    Negative: COVID-19 vaccine, questions about    Negative: [1] Lives with someone known to have influenza (flu test positive) AND [2] flu-like symptoms (e.g., cough, runny nose, sore throat, SOB; with or without fever)    Negative: [1] Adult with possible COVID-19 symptoms AND [2] triager concerned about severity of symptoms or other causes    Negative: COVID-19 and breastfeeding, questions about    Negative: SEVERE or constant chest pain or pressure  (Exception: Mild central chest pain, present only when coughing.)    Negative: MODERATE difficulty breathing (e.g., speaks in phrases, SOB even at rest, pulse 100-120)    Negative: Headache and stiff neck (can't touch chin to chest)    Negative: Oxygen level (e.g., pulse oximetry) 90 percent or lower    Negative: Chest pain or pressure    Negative: Patient sounds very sick or weak to the triager    Negative: MILD difficulty breathing (e.g., minimal/no SOB at rest, SOB with walking, pulse <100)    Negative: Fever > 103 F (39.4 C)    Negative: [1] Fever > 101 F (38.3 C) AND [2] over 60 years of age    Negative: [1] Fever > 100.0 F (37.8 C) AND [2] bedridden (e.g., nursing home patient, CVA, chronic illness, recovering from surgery)    Negative: HIGH RISK for severe COVID complications (e.g., weak immune system, age > 64 years, obesity with BMI > 25, pregnant, chronic lung disease or other chronic medical condition) (Exception: Already seen by PCP and no new or worsening symptoms.)    Negative: [1] HIGH RISK patient AND [2] influenza is widespread in the community AND [3] ONE OR MORE respiratory symptoms: cough, sore throat, runny or stuffy nose    Negative: [1] HIGH RISK patient AND [2]  influenza exposure within the last 7 days AND [3] ONE OR MORE respiratory symptoms: cough, sore throat, runny or stuffy nose    Negative: Oxygen level (e.g., pulse oximetry) 91 to 94 percent    Negative: [1] COVID-19 infection suspected by caller or triager AND [2] mild symptoms (cough, fever, or others) AND [3] negative COVID-19 rapid test    Negative: Fever present > 3 days (72 hours)    Negative: [1] Fever returns after gone for over 24 hours AND [2] symptoms worse or not improved    Negative: [1] Continuous (nonstop) coughing interferes with work or school AND [2] no improvement using cough treatment per Care Advice    Negative: Cough present > 3 weeks    Negative: [1] COVID-19 diagnosed by positive lab test (e.g., PCR, rapid self-test kit) AND [2] NO symptoms (e.g., cough, fever, others)    Protocols used: CORONAVIRUS (COVID-19) DIAGNOSED OR [id number] 1.18.2022

## 2022-10-01 ENCOUNTER — HEALTH MAINTENANCE LETTER (OUTPATIENT)
Age: 42
End: 2022-10-01

## 2022-10-04 DIAGNOSIS — L65.9 HAIR LOSS: ICD-10-CM

## 2022-10-04 RX ORDER — FINASTERIDE 1 MG/1
TABLET, FILM COATED ORAL
Qty: 90 TABLET | Refills: 3 | Status: SHIPPED | OUTPATIENT
Start: 2022-10-04

## 2022-10-04 NOTE — TELEPHONE ENCOUNTER
"Routing refill request to provider for review/approval because:  Patient needs to be seen because it has been more than 1 year since last office visit.    Last Written Prescription Date:  10/14/21  Last Fill Quantity: 90,  # refills: 3   Last office visit provider:  11/21/2019     Requested Prescriptions   Pending Prescriptions Disp Refills     finasteride (PROPECIA) 1 MG tablet [Pharmacy Med Name: FINASTERIDE 1 MG TABLET] 90 tablet 3     Sig: TAKE 1 TABLET BY MOUTH DAILY       Miscellaneous Dermatologic Agents Failed - 10/4/2022 12:26 AM        Failed - Recent (12 mo) or future (30 days) visit within the authorizing provider's specialty     Patient has had an office visit with the authorizing provider or a provider within the authorizing providers department within the previous 12 mos or has a future within next 30 days. See \"Patient Info\" tab in inbasket, or \"Choose Columns\" in Meds & Orders section of the refill encounter.              Failed - Refill request is not for Imiquimod, 5-Fluorouracil, or Finasteride      If Imiquimod, 5-Fluorouracil, or Finasteride, may refill if indicated in progress notes.           Passed - Medication is active on med list        Passed - Patient is 24 mos old or older       BPH Agents Failed - 10/4/2022 12:26 AM        Failed - Recent (12 mo) or future (30 days) visit within the authorizing provider's department     Patient has had an office visit with the authorizing provider or a provider within the authorizing providers department within the previous 12 mos or has a future within next 30 days. See \"Patient Info\" tab in inbasket, or \"Choose Columns\" in Meds & Orders section of the refill encounter.              Passed - Medication is active on med list        Passed - Patient is 18 years of age or older             [place], Bryanna, RN 10/04/22 12:08 PM  "

## 2022-11-21 NOTE — PROGRESS NOTES
Discharge Note    Progress reporting period is from initial evaluation date (please see noted date below) to Mar 31, 2022.  Linked Episodes   Type: Episode: Status: Noted: Resolved: Last update: Updated by:   PHYSICAL THERAPY R shoulder pain 3/31/22 Active 3/31/2022  3/31/2022 10:08 AM Fly Mchugh PT      Comments:       Pradip failed to follow up and current status is unknown.  Please see information below for last relevant information on current status.  Patient seen for 1 visits.    SUBJECTIVE  Subjective changes noted by patient:  see eval  .  Current pain level is 3/10.     Previous pain level was  4/10.   Changes in function:  Yes (See Goal flowsheet attached for changes in current functional level)  Adverse reaction to treatment or activity: None    OBJECTIVE  Changes noted in objective findings: see eval     ASSESSMENT/PLAN  Diagnosis: R shoulder pain   Updated problem list and treatment plan:   Pain - HEP  Decreased ROM/flexibility - HEP  Decreased function - HEP  Decreased strength - HEP  STG/LTGs have been met or progress has been made towards goals:  Yes, please see goal flowsheet for most current information  Assessment of Progress: current status is unknown.    Last current status:     Self Management Plans:  HEP  I have re-evaluated this patient and find that the nature, scope, duration and intensity of the therapy is appropriate for the medical condition of the patient.  Pradip continues to require the following intervention to meet STG and LTG's:  HEP.    Recommendations:  Discharge with current home program.  Patient to follow up with MD as needed.    Please refer to the daily flowsheet for treatment today, total treatment time and time spent performing 1:1 timed codes.

## 2023-08-06 ENCOUNTER — HEALTH MAINTENANCE LETTER (OUTPATIENT)
Age: 43
End: 2023-08-06

## 2023-10-11 NOTE — TELEPHONE ENCOUNTER
Pt called back and I read below. Advised he needs to establish care with new provider.    We tried to schedule but he wanted sooner appt than  what I could give him [facility].  Transferred to main scheduling to check other clinics.

## 2024-09-29 ENCOUNTER — HEALTH MAINTENANCE LETTER (OUTPATIENT)
Age: 44
End: 2024-09-29

## 2024-11-25 SDOH — HEALTH STABILITY: PHYSICAL HEALTH: ON AVERAGE, HOW MANY DAYS PER WEEK DO YOU ENGAGE IN MODERATE TO STRENUOUS EXERCISE (LIKE A BRISK WALK)?: 6 DAYS

## 2024-11-25 SDOH — HEALTH STABILITY: PHYSICAL HEALTH: ON AVERAGE, HOW MANY MINUTES DO YOU ENGAGE IN EXERCISE AT THIS LEVEL?: 40 MIN

## 2024-11-25 ASSESSMENT — SOCIAL DETERMINANTS OF HEALTH (SDOH): HOW OFTEN DO YOU GET TOGETHER WITH FRIENDS OR RELATIVES?: THREE TIMES A WEEK

## 2024-11-26 ENCOUNTER — OFFICE VISIT (OUTPATIENT)
Dept: INTERNAL MEDICINE | Facility: CLINIC | Age: 44
End: 2024-11-26
Payer: COMMERCIAL

## 2024-11-26 VITALS
OXYGEN SATURATION: 99 % | WEIGHT: 200.1 LBS | HEIGHT: 76 IN | RESPIRATION RATE: 14 BRPM | SYSTOLIC BLOOD PRESSURE: 111 MMHG | HEART RATE: 71 BPM | TEMPERATURE: 96.6 F | BODY MASS INDEX: 24.37 KG/M2 | DIASTOLIC BLOOD PRESSURE: 60 MMHG

## 2024-11-26 DIAGNOSIS — L57.0 ACTINIC KERATOSES: ICD-10-CM

## 2024-11-26 DIAGNOSIS — Z12.11 SCREEN FOR COLON CANCER: Primary | ICD-10-CM

## 2024-11-26 DIAGNOSIS — Z11.59 NEED FOR HEPATITIS C SCREENING TEST: ICD-10-CM

## 2024-11-26 DIAGNOSIS — Z11.4 SCREENING FOR HIV (HUMAN IMMUNODEFICIENCY VIRUS): ICD-10-CM

## 2024-11-26 DIAGNOSIS — Z13.220 SCREENING FOR HYPERLIPIDEMIA: ICD-10-CM

## 2024-11-26 DIAGNOSIS — Z00.00 ENCOUNTER FOR GENERAL HEALTH EXAMINATION: ICD-10-CM

## 2024-11-26 DIAGNOSIS — S86.019D RUPTURE OF ACHILLES TENDON, UNSPECIFIED LATERALITY, SUBSEQUENT ENCOUNTER: ICD-10-CM

## 2024-11-26 LAB
ALBUMIN SERPL BCG-MCNC: 4.8 G/DL (ref 3.5–5.2)
ALP SERPL-CCNC: 70 U/L (ref 40–150)
ALT SERPL W P-5'-P-CCNC: 27 U/L (ref 0–70)
ANION GAP SERPL CALCULATED.3IONS-SCNC: 8 MMOL/L (ref 7–15)
AST SERPL W P-5'-P-CCNC: 39 U/L (ref 0–45)
BILIRUB SERPL-MCNC: 0.7 MG/DL
BUN SERPL-MCNC: 17.8 MG/DL (ref 6–20)
CALCIUM SERPL-MCNC: 9.7 MG/DL (ref 8.8–10.4)
CHLORIDE SERPL-SCNC: 101 MMOL/L (ref 98–107)
CHOLEST SERPL-MCNC: 180 MG/DL
CREAT SERPL-MCNC: 1.29 MG/DL (ref 0.67–1.17)
EGFRCR SERPLBLD CKD-EPI 2021: 70 ML/MIN/1.73M2
ERYTHROCYTE [DISTWIDTH] IN BLOOD BY AUTOMATED COUNT: 12.5 % (ref 10–15)
FASTING STATUS PATIENT QL REPORTED: YES
FASTING STATUS PATIENT QL REPORTED: YES
GLUCOSE SERPL-MCNC: 104 MG/DL (ref 70–99)
HCO3 SERPL-SCNC: 27 MMOL/L (ref 22–29)
HCT VFR BLD AUTO: 38.4 % (ref 40–53)
HDLC SERPL-MCNC: 71 MG/DL
HGB BLD-MCNC: 13.4 G/DL (ref 13.3–17.7)
LDLC SERPL CALC-MCNC: 91 MG/DL
MCH RBC QN AUTO: 31.3 PG (ref 26.5–33)
MCHC RBC AUTO-ENTMCNC: 34.9 G/DL (ref 31.5–36.5)
MCV RBC AUTO: 90 FL (ref 78–100)
NONHDLC SERPL-MCNC: 109 MG/DL
PLATELET # BLD AUTO: 217 10E3/UL (ref 150–450)
POTASSIUM SERPL-SCNC: 4.3 MMOL/L (ref 3.4–5.3)
PROT SERPL-MCNC: 7 G/DL (ref 6.4–8.3)
RBC # BLD AUTO: 4.28 10E6/UL (ref 4.4–5.9)
SODIUM SERPL-SCNC: 136 MMOL/L (ref 135–145)
TRIGL SERPL-MCNC: 91 MG/DL
WBC # BLD AUTO: 9.2 10E3/UL (ref 4–11)

## 2024-11-26 PROCEDURE — 80061 LIPID PANEL: CPT | Performed by: INTERNAL MEDICINE

## 2024-11-26 PROCEDURE — 85027 COMPLETE CBC AUTOMATED: CPT | Performed by: INTERNAL MEDICINE

## 2024-11-26 PROCEDURE — 80053 COMPREHEN METABOLIC PANEL: CPT | Performed by: INTERNAL MEDICINE

## 2024-11-26 PROCEDURE — 99386 PREV VISIT NEW AGE 40-64: CPT | Performed by: INTERNAL MEDICINE

## 2024-11-26 PROCEDURE — 36415 COLL VENOUS BLD VENIPUNCTURE: CPT | Performed by: INTERNAL MEDICINE

## 2024-11-26 ASSESSMENT — PAIN SCALES - GENERAL: PAINLEVEL_OUTOF10: NO PAIN (0)

## 2024-11-26 NOTE — PROGRESS NOTES
Preventive Care Visit  [facility]  Chad Ruiz MD, Internal Medicine  Nov 26, 2024    Assessment & Plan     Screen for colon cancer  He agrees to begin screening.   - Colonoscopy Screening  Referral    Screening for HIV (human immunodeficiency virus)  He declines, he has no risk factors    Need for hepatitis C screening test  He declines, he has no risk factors.     Rupture of Achilles tendon, unspecified laterality, subsequent encounter  S/p repair.     Actinic keratoses  Once, s/p treatment.  His exam is currently negative.  We discussed risks of skin cancer, and the common types.     Encounter for general health examination  His history and examination are negative today, no concerns are noted.  Se documentation.   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     Follow up one year and as needed.     Eliza Moseley is a 44 year old, presenting for the following:  Physical (Patient reports they are here for annual physical. )        11/26/2024     8:40 AM   Additional Questions   Roomed by Eileen   Accompanied by alone         11/26/2024     8:40 AM   Patient Reported Additional Medications   Patient reports taking the following new medications none     HPI  He has been well.  No unusual dyspnea or cough or bowel or bladder issues.  He exercises well, and tolerates that well.  Did have an actinic keratosis that has been treated. Does use sun screen regularly.      Health Care Directive  Patient does not have a Health Care Directive: Discussed advance care planning with patient; however, patient declined at this time.      11/25/2024   General Health   How would you rate your overall physical health? Excellent   Feel stress (tense,  anxious, or unable to sleep) Only a little      (!) STRESS CONCERN      11/25/2024   Nutrition   Three or more servings of calcium each day? Yes   Diet: Regular (no restrictions)   How many servings of fruit and vegetables per day? (!) 2-3   How many sweetened beverages each day? 0-1         11/25/2024   Exercise   Days per week of moderate/strenous exercise 6 days   Average minutes spent exercising at this level 40 min          11/25/2024   Social Factors   Frequency of gathering with friends or relatives Three times a week   Worry food won't last until get money to buy more No   Food not last or not have enough money for food? No   Do you have housing? (Housing is defined as stable permanent housing and does not include staying ouside in a car, in a tent, in an abandoned building, in an overnight shelter, or couch-surfing.) Yes   Are you worried about losing your housing? No   Lack of transportation? No   Unable to get utilities (heat,electricity)? No          11/25/2024   Dental   Dentist two times every year? Yes          11/25/2024   TB Screening   Were you born outside of the [place]? No      Today's PHQ-2 Score:       11/25/2024     9:37 AM   PHQ-2 ( 1999 [employer])   Q1: Little interest or pleasure in doing things 0    Q2: Feeling down, depressed or hopeless 0    PHQ-2 Score 0    Q1: Little interest or pleasure in doing things Not at all   Q2: Feeling down, depressed or hopeless Not at all   PHQ-2 Score 0          11/25/2024   Substance Use   Alcohol more than 3/day or more than 7/wk No   Do you use any other substances recreationally? No      Social History     Tobacco Use    Smoking status: Never    Smokeless tobacco: Current     Types: Chew   Substance Use Topics    Alcohol use: Yes     Comment: Alcoholic Drinks/day: WEEKLY     Drug use: No         11/25/2024   One time HIV Screening   Previous HIV test? No          11/25/2024   STI Screening   New sexual partner(s) since last STI/HIV test? No          11/25/2024  "  Contraception/Family Planning   Questions about contraception or family planning No    Reviewed and updated as needed this visit by Provider    Past Surgical History:   Procedure Laterality Date    OTHER SURGICAL HISTORY  2001    periodontal     Review of Systems  See HPI    Objective      PHYSICAL EXAM:  General Appearance: In no acute distress  Vitals:    11/26/24 0841   BP: 111/60   BP Location: Left arm   Patient Position: Sitting   Cuff Size: Adult Regular   Pulse: 71   Resp: 14   Temp: (!) 96.6  F (35.9  C)   TempSrc: Tympanic   SpO2: 99%   Weight: 90.8 kg (200 lb 1.6 oz)   Height: 1.918 m (6' 3.5\")     Estimated body mass index is 24.68 kg/m  as calculated from the following:    Height as of this encounter: 1.918 m (6' 3.5\").    Weight as of this encounter: 90.8 kg (200 lb 1.6 oz).  SKIN:  no concerning lesions  EYES: Clear, fundi are unremarkable   HEENT: nose and throat clear, ears normal   NECK:  without cervical or axillary adenopathy  RESPIRATORY: Clear   CARDIOVASCULAR: S1, S2  ABDOMEN: soft, flat, and non-tender  EXTREMITIES:  no significant inflammation or edema  NEUROLOGIC: Speech is clear,  gait is normal  PSYCHIATRIC: Oriented X 3, thinking is clear     Signed Electronically by: Chad Ruiz MD    "

## 2025-03-03 ENCOUNTER — TRANSFERRED RECORDS (OUTPATIENT)
Dept: HEALTH INFORMATION MANAGEMENT | Facility: CLINIC | Age: 45
End: 2025-03-03
Payer: COMMERCIAL